# Patient Record
Sex: FEMALE | Race: OTHER | HISPANIC OR LATINO
[De-identification: names, ages, dates, MRNs, and addresses within clinical notes are randomized per-mention and may not be internally consistent; named-entity substitution may affect disease eponyms.]

---

## 2021-09-15 PROBLEM — Z00.00 ENCOUNTER FOR PREVENTIVE HEALTH EXAMINATION: Status: ACTIVE | Noted: 2021-09-15

## 2021-09-16 ENCOUNTER — NON-APPOINTMENT (OUTPATIENT)
Age: 21
End: 2021-09-16

## 2021-09-16 ENCOUNTER — APPOINTMENT (OUTPATIENT)
Dept: OTOLARYNGOLOGY | Facility: CLINIC | Age: 21
End: 2021-09-16
Payer: COMMERCIAL

## 2021-09-16 VITALS — WEIGHT: 212 LBS | HEIGHT: 67 IN | TEMPERATURE: 97.2 F | BODY MASS INDEX: 33.27 KG/M2

## 2021-09-16 DIAGNOSIS — S09.93XA UNSPECIFIED INJURY OF FACE, INITIAL ENCOUNTER: ICD-10-CM

## 2021-09-16 DIAGNOSIS — Z72.89 OTHER PROBLEMS RELATED TO LIFESTYLE: ICD-10-CM

## 2021-09-16 PROCEDURE — 13152 CMPLX RPR E/N/E/L 2.6-7.5 CM: CPT

## 2021-09-16 PROCEDURE — 99204 OFFICE O/P NEW MOD 45 MIN: CPT | Mod: 25

## 2021-09-16 RX ORDER — TRETINOIN 0.25 MG/G
0.03 CREAM TOPICAL
Qty: 45 | Refills: 0 | Status: ACTIVE | COMMUNITY
Start: 2021-07-19

## 2021-09-16 RX ORDER — AZELAIC ACID 0.15 G/G
15 GEL TOPICAL
Qty: 50 | Refills: 0 | Status: ACTIVE | COMMUNITY
Start: 2021-05-10

## 2021-09-16 RX ORDER — CEPHALEXIN 500 MG/1
500 CAPSULE ORAL
Qty: 28 | Refills: 0 | Status: ACTIVE | COMMUNITY
Start: 2021-09-13

## 2021-09-16 RX ORDER — CLINDAMYCIN PHOSPHATE 10 MG/ML
1 LOTION TOPICAL
Qty: 60 | Refills: 0 | Status: ACTIVE | COMMUNITY
Start: 2021-07-01

## 2021-09-16 RX ORDER — SPIRONOLACTONE 100 MG/1
100 TABLET ORAL
Qty: 60 | Refills: 0 | Status: ACTIVE | COMMUNITY
Start: 2021-09-09

## 2021-09-16 RX ORDER — LEVONORGESTREL AND ETHINYL ESTRADIOL 0.1-0.02MG
KIT ORAL
Refills: 0 | Status: ACTIVE | COMMUNITY

## 2021-09-16 NOTE — ASSESSMENT
[FreeTextEntry1] : Complex closure of laceration, delayed fashion. The patient will continue her antibiotics. She did receive a tetanus shot in the emergency room. Recommended bacitracin and ice, head of bed elevation. Follow up one week for suture removal, call me sooner if she has any concerns
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
## 2021-09-22 ENCOUNTER — APPOINTMENT (OUTPATIENT)
Dept: OTOLARYNGOLOGY | Facility: CLINIC | Age: 21
End: 2021-09-22
Payer: COMMERCIAL

## 2021-09-22 DIAGNOSIS — Z98.890 OTHER SPECIFIED POSTPROCEDURAL STATES: ICD-10-CM

## 2021-09-22 PROCEDURE — 99024 POSTOP FOLLOW-UP VISIT: CPT

## 2021-09-22 NOTE — REASON FOR VISIT
[de-identified] : Patient is a 70 status post complex closure of lower eyelid laceration. Reports it is slightly red but not painful or purulent\par \par Physical exam: The sutures were removed. Skin edges well approximated. Mild erythema diffusely, no underlying collection, induration, or purulence.\par \par Assessment/plan: Patient with instructed in continuing bacitracin/Neosporin for another 2 weeks. She will use of silicone scar strips at night. Followup to 3 weeks for clearance prior to using make up

## 2021-10-04 ENCOUNTER — APPOINTMENT (OUTPATIENT)
Dept: OTOLARYNGOLOGY | Facility: CLINIC | Age: 21
End: 2021-10-04
Payer: COMMERCIAL

## 2021-10-04 DIAGNOSIS — S01.111A LACERATION W/OUT FOREIGN BODY OF RIGHT EYELID AND PERIOCULAR AREA, INITIAL ENCOUNTER: ICD-10-CM

## 2021-10-04 PROCEDURE — 99213 OFFICE O/P EST LOW 20 MIN: CPT | Mod: 95

## 2021-10-05 PROBLEM — S01.111A EYELID LACERATION, RIGHT: Status: ACTIVE | Noted: 2021-09-16
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
## 2021-10-05 NOTE — ASSESSMENT
[FreeTextEntry1] : Complex closure of laceration, healing well, based on the limits of telemed.   The patient will start gentle massage and silicone scar strips. . Follow up 1-2 months, in person for scar eval.  Cleared for makeup, call me sooner if she has any concerns